# Patient Record
Sex: FEMALE | Race: WHITE | NOT HISPANIC OR LATINO | Employment: FULL TIME | URBAN - METROPOLITAN AREA
[De-identification: names, ages, dates, MRNs, and addresses within clinical notes are randomized per-mention and may not be internally consistent; named-entity substitution may affect disease eponyms.]

---

## 2022-08-19 LAB
EXTERNAL HIV CONFIRMATION: NORMAL
EXTERNAL HIV SCREEN: NORMAL
HCV AB SER-ACNC: NEGATIVE

## 2023-08-04 ENCOUNTER — TELEPHONE (OUTPATIENT)
Dept: ADMINISTRATIVE | Facility: OTHER | Age: 28
End: 2023-08-04

## 2023-08-04 NOTE — TELEPHONE ENCOUNTER
----- Message from [employer] sent at 8/4/2023  9:16 AM EDT -----  Regarding: Care Gap Update  08/04/23 9:16 AM    Hello, our patient No patient name on file. has had Pap Smear (HPV) aka Cervical Cancer Screening completed/performed. Please assist in updating the patient chart by pulling the Care Everywhere (CE) document. The date of service is 07/27/2022. Hello, our patient No patient name on file. has had Hepatitis C completed/performed. Please assist in updating the patient chart by pulling the Care Everywhere (CE) document. The date of service is 08/19/2022. Hello, our patient No patient name on file. has had HIV completed/performed. Please assist in updating the patient chart by pulling the Care Everywhere (CE) document. The date of service is 08/19/2022.      Thank you,  Protean Payment Cait  [facility]

## 2023-08-07 NOTE — TELEPHONE ENCOUNTER
Upon review of the In Basket request we were able to locate, review, and update the patient chart as requested for Hepatitis C .HIV, Pap Smear. Any additional questions or concerns should be emailed to the Practice Liaisons via the appropriate education email address, please do not reply via In Basket.     Thank you  Colonel Montana

## 2024-12-12 ENCOUNTER — APPOINTMENT (EMERGENCY)
Dept: RADIOLOGY | Facility: HOSPITAL | Age: 29
End: 2024-12-12
Payer: COMMERCIAL

## 2024-12-12 ENCOUNTER — HOSPITAL ENCOUNTER (EMERGENCY)
Facility: HOSPITAL | Age: 29
Discharge: HOME | End: 2024-12-12
Attending: EMERGENCY MEDICINE
Payer: COMMERCIAL

## 2024-12-12 VITALS
HEART RATE: 82 BPM | TEMPERATURE: 97.9 F | RESPIRATION RATE: 16 BRPM | OXYGEN SATURATION: 98 % | BODY MASS INDEX: 39.37 KG/M2 | DIASTOLIC BLOOD PRESSURE: 94 MMHG | SYSTOLIC BLOOD PRESSURE: 170 MMHG | HEIGHT: 66 IN | WEIGHT: 245 LBS

## 2024-12-12 DIAGNOSIS — S67.10XA CRUSHING INJURY OF FINGER OF LEFT HAND: Primary | ICD-10-CM

## 2024-12-12 PROCEDURE — 73130 X-RAY EXAM OF HAND: CPT

## 2024-12-12 PROCEDURE — 99283 EMERGENCY DEPT VISIT LOW MDM: CPT

## 2024-12-12 PROCEDURE — 99284 EMERGENCY DEPT VISIT MOD MDM: CPT | Performed by: EMERGENCY MEDICINE

## 2024-12-12 NOTE — ED PROVIDER NOTES
Time reflects when diagnosis was documented in both MDM as applicable and the Disposition within this note       Time User Action Codes Description Comment    12/12/2024  1:25 PM Steve Carter Add [S67.10XA] Crushing injury of finger of left hand           ED Disposition       ED Disposition   Discharge    Condition   Stable    Date/Time   Thu Dec 12, 2024  1:25 PM    Comment   Carola Murillo discharge to home/self care.                   Assessment & Plan       Medical Decision Making  Patient presented to the emergency department and a MSE was performed. The patient was evaluated for complaint related to acute left hand pain.  Patient is potentially at risk for, but not limited to, strain, sprain, fracture, dislocation or ligamentous disruption.  Several of these diagnoses have been evaluated and ruled out by history and physical.  As needed, patient will be further evaluated with laboratory and imaging studies.  Higher level diagnostics, such as CT imaging or ultrasound, may also be required.  Please see work-up portion of the note for further evaluation of patient's risk.  Socioeconomic factors were also considered as part of the decision-making process.  Unless otherwise stated in the chart or patient is admitted as elsewhere documented, any previously prescribed medications will be maintained.      Problems Addressed:  Crushing injury of finger of left hand: acute illness or injury    Amount and/or Complexity of Data Reviewed  Radiology: ordered and independent interpretation performed.        ED Course as of 12/12/24 1328   Thu Dec 12, 2024   1325 Imaging negative for fracture.  Patient stable for discharge.       Medications - No data to display    ED Risk Strat Scores                          SBIRT 20yo+      Flowsheet Row Most Recent Value   Initial Alcohol Screen: US AUDIT-C     1. How often do you have a drink containing alcohol? 0 Filed at: 12/12/2024 1315   2. How many drinks containing alcohol do  you have on a typical day you are drinking?  0 Filed at: 2024 1315   3b. FEMALE Any Age, or MALE 65+: How often do you have 4 or more drinks on one occassion? 0 Filed at: 2024 1315   Audit-C Score 0 Filed at: 2024 131   RENEE: How many times in the past year have you...    Used an illegal drug or used a prescription medication for non-medical reasons? Never Filed at: 2024 1315                            History of Present Illness       Chief Complaint   Patient presents with    Finger Injury     Left middle and ring finger closed in car door. C/o pain        Past Medical History:   Diagnosis Date    GERD (gastroesophageal reflux disease)       Past Surgical History:   Procedure Laterality Date     SECTION      DENTAL SURGERY      TONSILECTOMY AND ADNOIDECTOMY        Family History   Problem Relation Age of Onset    Heart disease Mother     Hypertension Mother     Stroke Mother     Diabetes Father         Spinal cord tumor and blood clot    Asthma Sister     Uterine cancer Maternal Grandmother       Social History     Tobacco Use    Smoking status: Never    Smokeless tobacco: Never   Substance Use Topics    Alcohol use: Never    Drug use: Never      E-Cigarette/Vaping      E-Cigarette/Vaping Substances      I have reviewed and agree with the history as documented.     Patient is a 29-year-old female who reports that her left hand was caught in a car door today.  Patient is having pain in the distal aspect of the left third and fourth finger.  No decreased range of motion.  No other injury.          Review of Systems   Musculoskeletal:  Negative for joint swelling and myalgias.   Skin:  Negative for wound.           Objective       ED Triage Vitals [24 1258]   Temperature Pulse Blood Pressure Respirations SpO2 Patient Position - Orthostatic VS   97.9 °F (36.6 °C) 82 170/94 16 98 % --      Temp Source Heart Rate Source BP Location FiO2 (%) Pain Score    Temporal Monitor Right arm  -- --      Vitals      Date and Time Temp Pulse SpO2 Resp BP Pain Score FACES Pain Rating User   12/12/24 1258 97.9 °F (36.6 °C) 82 98 % 16 170/94 -- -- SS            Physical Exam  Vitals and nursing note reviewed.   Constitutional:       General: She is not in acute distress.  HENT:      Head: Normocephalic and atraumatic.   Pulmonary:      Effort: Pulmonary effort is normal.   Abdominal:      General: Abdomen is flat. There is no distension.   Musculoskeletal:         General: Tenderness present. No signs of injury.      Comments: Tenderness to third and fourth finger of left hand.  Distal aspect.   Skin:     Coloration: Skin is not pale.   Neurological:      General: No focal deficit present.      Mental Status: She is alert.   Psychiatric:         Mood and Affect: Mood normal.         Results Reviewed       None            XR hand 3+ views LEFT   ED Interpretation by Carter Wilson DO (12/12 1325)   No fracture.          Procedures    ED Medication and Procedure Management   Prior to Admission Medications   Prescriptions Last Dose Informant Patient Reported? Taking?   omeprazole (PriLOSEC) 20 mg delayed release capsule   Yes No   Sig: Take 1 capsule by mouth daily      Facility-Administered Medications: None     Patient's Medications   Discharge Prescriptions    No medications on file     No discharge procedures on file.  ED SEPSIS DOCUMENTATION   Time reflects when diagnosis was documented in both MDM as applicable and the Disposition within this note       Time User Action Codes Description Comment    12/12/2024  1:25 PM Carter Wilson Add [S67.10XA] Crushing injury of finger of left hand                  Carter Wilson DO  12/12/24 1328

## 2024-12-12 NOTE — DISCHARGE INSTRUCTIONS
Use Tylenol or ibuprofen as needed for pain.  We recommend ice 4-6 times a day for 15 to 20 minutes at a time to the affected area.  Try and leave the area elevated to help reduce swelling and pain.  Return with any worsening, particularly increased pain, severe swelling, or any other symptomatology that seems concerning.    Your imaging studies have been preliminarily reviewed by the emergency department.  Further review by Radiology is pending at this time.  If there is a discrepancy or a finding of additional concern identified, we will attempt to contact you at the number you have provided us.  If you do not hear from us, follow-up with your primary care provider within 1-2 weeks is always recommended to ensure that all findings were normal or as initially reported.  Your results may also be available on [email or web address] [email or web address]    Please also note that sometimes there are subtle abnormalities in your lab values that you may observe when you access your record online.  These are frequently not worrisome and if they are of concern we will have discussed them with you.  However, we always encourage that you discuss any concerns you may have or observe on your record with your primary care provider.  Please also note that while your visit documentation was reviewed prior to completion, voice transcription will occasionally recognize words or grammar differently than what was spoken.

## 2025-03-10 ENCOUNTER — DOCTOR'S OFFICE (OUTPATIENT)
Dept: URBAN - NONMETROPOLITAN AREA CLINIC 1 | Facility: CLINIC | Age: 30
Setting detail: OPHTHALMOLOGY
End: 2025-03-10
Payer: COMMERCIAL

## 2025-03-10 ENCOUNTER — OPTICAL OFFICE (OUTPATIENT)
Dept: URBAN - NONMETROPOLITAN AREA CLINIC 4 | Facility: CLINIC | Age: 30
Setting detail: OPHTHALMOLOGY
End: 2025-03-10
Payer: COMMERCIAL

## 2025-03-10 DIAGNOSIS — H52.223: ICD-10-CM

## 2025-03-10 DIAGNOSIS — H52.03: ICD-10-CM

## 2025-03-10 PROBLEM — Z01.00 GOOD OCULAR HEALTH OU: Status: ACTIVE | Noted: 2025-03-10

## 2025-03-10 PROCEDURE — V2020 VISION SVCS FRAMES PURCHASES: HCPCS | Performed by: OPTOMETRIST

## 2025-03-10 PROCEDURE — 92015 DETERMINE REFRACTIVE STATE: CPT | Performed by: OPTOMETRIST

## 2025-03-10 PROCEDURE — V2784 LENS POLYCARB OR EQUAL: HCPCS | Performed by: OPTOMETRIST

## 2025-03-10 PROCEDURE — V2103 SPHEROCYLINDR 4.00D/12-2.00D: HCPCS | Mod: LT | Performed by: OPTOMETRIST

## 2025-03-10 PROCEDURE — 92004 COMPRE OPH EXAM NEW PT 1/>: CPT | Performed by: OPTOMETRIST

## 2025-03-10 PROCEDURE — V2784 LENS POLYCARB OR EQUAL: HCPCS | Mod: LT | Performed by: OPTOMETRIST

## 2025-03-10 PROCEDURE — V2103 SPHEROCYLINDR 4.00D/12-2.00D: HCPCS | Performed by: OPTOMETRIST

## 2025-03-10 ASSESSMENT — REFRACTION_MANIFEST
OD_AXIS: 105
OD_CYLINDER: -1.25
OS_AXIS: 075
OS_VA2: 20/20
OS_VA1: 20/20
OD_SPHERE: +0.50
OS_CYLINDER: -1.25
OD_VA2: 20/20
OS_SPHERE: +0.25
OD_VA1: 20/20

## 2025-03-10 ASSESSMENT — REFRACTION_AUTOREFRACTION
OD_CYLINDER: -1.25
OD_AXIS: 106
OS_AXIS: 071
OD_SPHERE: +0.50
OS_SPHERE: +1.00
OS_CYLINDER: -1.50

## 2025-03-10 ASSESSMENT — VISUAL ACUITY
OD_BCVA: 20/25
OS_BCVA: 20/20-1

## 2025-03-10 ASSESSMENT — CONFRONTATIONAL VISUAL FIELD TEST (CVF)
OS_FINDINGS: FULL
OD_FINDINGS: FULL

## 2025-03-10 ASSESSMENT — TONOMETRY
OS_IOP_MMHG: 15
OD_IOP_MMHG: 15